# Patient Record
(demographics unavailable — no encounter records)

---

## 2025-05-19 NOTE — HISTORY OF PRESENT ILLNESS
[FreeTextEntry8] : 34 y/o female presents for 13 days of URI symptoms. Pt "sick since May 5." Caught from . Started as cough and sore throat. Used OTC meds (Guafenesin or Robitussin AM/PM) but continues to have "exhausting cough with lots of sticky mucus." Denies fever.     Would it be possible to see me for a check?     Id appreciate if you could send me a prescription for cough suppressant, I ran out of Robitussin (I need to survive tomorrows Graduation Ceremony :(     Thank you so much.     Amal

## 2025-05-19 NOTE — REVIEW OF SYSTEMS
[Nasal Discharge] : nasal discharge [Postnasal Drip] : postnasal drip [Cough] : cough [Negative] : Heme/Lymph [Shortness Of Breath] : shortness of breath [Wheezing] : wheezing

## 2025-05-19 NOTE — HEALTH RISK ASSESSMENT
[0] : 2) Feeling down, depressed, or hopeless: Not at all (0) [PHQ-2 Negative - No further assessment needed] : PHQ-2 Negative - No further assessment needed [Never] : Never [[id number]] : 0

## 2025-05-19 NOTE — ASSESSMENT
[FreeTextEntry1] : Pt with presumed viral URI.  Obtain CXR. RVP sent. Start Predisone 20 mg qAM and Benzonatate PRN. Pt with known LPR and will add back Pepcid nightly. No indication for Abx right now.